# Patient Record
Sex: FEMALE | Race: BLACK OR AFRICAN AMERICAN | NOT HISPANIC OR LATINO | Employment: UNEMPLOYED | URBAN - METROPOLITAN AREA
[De-identification: names, ages, dates, MRNs, and addresses within clinical notes are randomized per-mention and may not be internally consistent; named-entity substitution may affect disease eponyms.]

---

## 2020-09-03 PROBLEM — M54.50 LOW BACK PAIN: Status: ACTIVE | Noted: 2020-09-03

## 2021-03-25 PROBLEM — M54.9 DORSALGIA, UNSPECIFIED: Status: ACTIVE | Noted: 2020-09-03

## 2021-03-25 PROBLEM — M79.2 NEURALGIA: Status: ACTIVE | Noted: 2021-03-25

## 2021-03-25 PROBLEM — M48.062 SPINAL STENOSIS OF LUMBAR REGION WITH NEUROGENIC CLAUDICATION: Status: ACTIVE | Noted: 2021-03-25

## 2021-08-03 ENCOUNTER — NURSE TRIAGE (OUTPATIENT)
Dept: ADMINISTRATIVE | Facility: CLINIC | Age: 36
End: 2021-08-03

## 2023-01-23 PROBLEM — Z34.91 INITIAL OBSTETRIC VISIT, FIRST TRIMESTER: Status: ACTIVE | Noted: 2023-01-23

## 2023-01-23 PROBLEM — O09.521 MULTIGRAVIDA OF ADVANCED MATERNAL AGE IN FIRST TRIMESTER: Status: ACTIVE | Noted: 2023-01-23

## 2023-03-02 PROBLEM — N39.0 UTI (URINARY TRACT INFECTION): Status: ACTIVE | Noted: 2023-03-02

## 2023-04-08 PROBLEM — O23.592 TRICHOMONAL VAGINITIS DURING PREGNANCY IN SECOND TRIMESTER: Status: ACTIVE | Noted: 2023-04-08

## 2023-04-08 PROBLEM — A59.01 TRICHOMONAL VAGINITIS DURING PREGNANCY IN SECOND TRIMESTER: Status: ACTIVE | Noted: 2023-04-08

## 2023-04-10 PROBLEM — B96.89 UTI DUE TO KLEBSIELLA SPECIES: Status: ACTIVE | Noted: 2023-03-02

## 2023-04-11 PROBLEM — O26.852 SPOTTING AFFECTING PREGNANCY IN SECOND TRIMESTER: Status: ACTIVE | Noted: 2023-04-11

## 2023-04-25 ENCOUNTER — PATIENT MESSAGE (OUTPATIENT)
Dept: ADMINISTRATIVE | Facility: OTHER | Age: 38
End: 2023-04-25

## 2023-05-19 PROBLEM — N94.9 ROUND LIGAMENT PAIN: Status: ACTIVE | Noted: 2023-05-19

## 2023-05-19 PROBLEM — M54.9 DORSALGIA, UNSPECIFIED: Status: RESOLVED | Noted: 2020-09-03 | Resolved: 2023-05-19

## 2023-05-19 PROBLEM — O26.852 SPOTTING AFFECTING PREGNANCY IN SECOND TRIMESTER: Status: RESOLVED | Noted: 2023-04-11 | Resolved: 2023-05-19

## 2023-05-19 PROBLEM — M79.2 NEURALGIA: Status: RESOLVED | Noted: 2021-03-25 | Resolved: 2023-05-19

## 2023-05-19 PROBLEM — Z34.91 INITIAL OBSTETRIC VISIT, FIRST TRIMESTER: Status: RESOLVED | Noted: 2023-01-23 | Resolved: 2023-05-19

## 2023-05-21 PROBLEM — A59.01 TRICHOMONAS VAGINITIS: Status: ACTIVE | Noted: 2023-05-21

## 2023-05-21 PROBLEM — O23.42 RECURRENT UTI (URINARY TRACT INFECTION) COMPLICATING PREGNANCY, SECOND TRIMESTER: Status: ACTIVE | Noted: 2023-05-21

## 2023-05-21 PROBLEM — O99.842 PREGNANCY COMPLICATED BY PREVIOUS GASTRIC BYPASS, ANTEPARTUM, SECOND TRIMESTER: Status: ACTIVE | Noted: 2023-05-21

## 2023-05-21 PROBLEM — Z3A.22 22 WEEKS GESTATION OF PREGNANCY: Status: ACTIVE | Noted: 2023-05-21

## 2023-05-21 PROBLEM — O09.92 SUPERVISION OF HIGH RISK PREGNANCY IN SECOND TRIMESTER: Status: ACTIVE | Noted: 2023-05-21

## 2023-05-23 PROBLEM — O99.842 PREVIOUS GASTRIC BYPASS AFFECTING PREGNANCY IN SECOND TRIMESTER, ANTEPARTUM: Status: ACTIVE | Noted: 2023-05-23

## 2023-06-07 PROBLEM — Z3A.22 22 WEEKS GESTATION OF PREGNANCY: Status: RESOLVED | Noted: 2023-05-21 | Resolved: 2023-06-07

## 2023-06-27 PROBLEM — O26.893 PELVIC PAIN AFFECTING PREGNANCY IN THIRD TRIMESTER, ANTEPARTUM: Status: ACTIVE | Noted: 2023-06-27

## 2023-06-27 PROBLEM — R10.2 PELVIC PAIN AFFECTING PREGNANCY IN THIRD TRIMESTER, ANTEPARTUM: Status: ACTIVE | Noted: 2023-06-27

## 2023-07-06 ENCOUNTER — NURSE TRIAGE (OUTPATIENT)
Dept: ADMINISTRATIVE | Facility: CLINIC | Age: 38
End: 2023-07-06

## 2023-07-06 PROBLEM — O16.3 ELEVATED BLOOD PRESSURE AFFECTING PREGNANCY IN THIRD TRIMESTER, ANTEPARTUM: Status: ACTIVE | Noted: 2023-07-06

## 2023-07-06 PROBLEM — N39.0 UTI (URINARY TRACT INFECTION): Status: ACTIVE | Noted: 2023-07-06

## 2023-07-06 NOTE — TELEPHONE ENCOUNTER
Pt reports she is 35 weeks pregnant with pain since 0700 this AM. Pain is in vaginal area. No relief w/ BM or medication. Reports pain feels like cramping and sharp pain. Advised, per protocol. She verbalizes understanding but may arrive POV.    Reason for Disposition   SEVERE abdominal pain (e.g., excruciating), constant, and present > 1 hour    Additional Information   Negative: Passed out (i.e., fainted, collapsed and was not responding)   Negative: Shock suspected (e.g., cold/pale/clammy skin, too weak to stand, low BP, rapid pulse)   Negative: Difficult to awaken or acting confused (e.g., disoriented, slurred speech)    Protocols used: Pregnancy - Abdominal Pain Greater Than 20 Weeks EGA-A-[place]

## 2023-07-10 PROBLEM — B96.89 UTI DUE TO KLEBSIELLA SPECIES: Status: RESOLVED | Noted: 2023-03-02 | Resolved: 2023-07-10

## 2023-07-10 PROBLEM — N39.0 UTI DUE TO KLEBSIELLA SPECIES: Status: RESOLVED | Noted: 2023-03-02 | Resolved: 2023-07-10

## 2023-07-13 PROBLEM — O12.13 GESTATIONAL PROTEINURIA IN THIRD TRIMESTER: Status: ACTIVE | Noted: 2023-07-13

## 2023-07-20 PROBLEM — O26.893 PELVIC PAIN AFFECTING PREGNANCY IN THIRD TRIMESTER, ANTEPARTUM: Status: RESOLVED | Noted: 2023-06-27 | Resolved: 2023-07-20

## 2023-07-20 PROBLEM — N39.0 UTI (URINARY TRACT INFECTION): Status: RESOLVED | Noted: 2023-07-06 | Resolved: 2023-07-20

## 2023-07-20 PROBLEM — O09.521 MULTIGRAVIDA OF ADVANCED MATERNAL AGE IN FIRST TRIMESTER: Status: RESOLVED | Noted: 2023-01-23 | Resolved: 2023-07-20

## 2023-07-20 PROBLEM — O12.13 GESTATIONAL PROTEINURIA IN THIRD TRIMESTER: Status: RESOLVED | Noted: 2023-07-13 | Resolved: 2023-07-20

## 2023-07-20 PROBLEM — N94.9 ROUND LIGAMENT PAIN: Status: RESOLVED | Noted: 2023-05-19 | Resolved: 2023-07-20

## 2023-07-20 PROBLEM — O09.92 SUPERVISION OF HIGH-RISK PREGNANCY, SECOND TRIMESTER: Status: RESOLVED | Noted: 2023-05-21 | Resolved: 2023-07-20

## 2023-07-20 PROBLEM — O41.03X0 OLIGOHYDRAMNIOS IN THIRD TRIMESTER: Status: ACTIVE | Noted: 2023-07-20

## 2023-07-20 PROBLEM — R10.2 PELVIC PAIN AFFECTING PREGNANCY IN THIRD TRIMESTER, ANTEPARTUM: Status: RESOLVED | Noted: 2023-06-27 | Resolved: 2023-07-20

## 2023-07-24 PROBLEM — O99.213 OBESITY AFFECTING PREGNANCY IN THIRD TRIMESTER: Status: ACTIVE | Noted: 2023-07-24

## 2023-07-24 PROBLEM — D62 ANEMIA DUE TO ACUTE BLOOD LOSS: Status: ACTIVE | Noted: 2023-07-24

## 2023-07-26 ENCOUNTER — TELEPHONE (OUTPATIENT)
Dept: ADMINISTRATIVE | Facility: CLINIC | Age: 38
End: 2023-07-26

## 2023-07-26 NOTE — PROGRESS NOTES
"Phoned patient in response to reply of "2" to post-discharge texting tracker text, "Do you have all the medications and supplies your doctor prescribed for you at discharge? If not, reply 2 to this text so that we can help you get them."  Ms. Infante states that she was supposed to receive something (which she believes is some type of belt) to hold up her belly so that it doesn't rest upon the incision site. Did not see mention of that in provider's discharge summary. Offered to send a message to provider to clarify or transfer to one of our triage nurses, but she declined both; stating that she is scheduled for a follow-up on Friday and will address then.  "

## 2023-10-23 PROBLEM — O23.42 RECURRENT UTI (URINARY TRACT INFECTION) COMPLICATING PREGNANCY, SECOND TRIMESTER: Status: RESOLVED | Noted: 2023-05-21 | Resolved: 2023-10-23

## 2023-10-23 PROBLEM — O41.03X0 OLIGOHYDRAMNIOS IN THIRD TRIMESTER: Status: RESOLVED | Noted: 2023-07-20 | Resolved: 2023-10-23
